# Patient Record
Sex: FEMALE | Race: OTHER | NOT HISPANIC OR LATINO
[De-identification: names, ages, dates, MRNs, and addresses within clinical notes are randomized per-mention and may not be internally consistent; named-entity substitution may affect disease eponyms.]

---

## 2022-08-22 ENCOUNTER — NON-APPOINTMENT (OUTPATIENT)
Age: 27
End: 2022-08-22

## 2022-08-24 ENCOUNTER — APPOINTMENT (OUTPATIENT)
Dept: OBGYN | Facility: CLINIC | Age: 27
End: 2022-08-24

## 2022-09-13 ENCOUNTER — NON-APPOINTMENT (OUTPATIENT)
Age: 27
End: 2022-09-13

## 2022-09-13 ENCOUNTER — LABORATORY RESULT (OUTPATIENT)
Age: 27
End: 2022-09-13

## 2022-09-13 ENCOUNTER — APPOINTMENT (OUTPATIENT)
Dept: OBGYN | Facility: CLINIC | Age: 27
End: 2022-09-13
Payer: SELF-PAY

## 2022-09-13 VITALS
BODY MASS INDEX: 38.09 KG/M2 | WEIGHT: 207 LBS | DIASTOLIC BLOOD PRESSURE: 90 MMHG | SYSTOLIC BLOOD PRESSURE: 130 MMHG | HEIGHT: 62 IN

## 2022-09-13 DIAGNOSIS — N93.9 ABNORMAL UTERINE AND VAGINAL BLEEDING, UNSPECIFIED: ICD-10-CM

## 2022-09-13 DIAGNOSIS — N89.8 OTHER SPECIFIED NONINFLAMMATORY DISORDERS OF VAGINA: ICD-10-CM

## 2022-09-13 DIAGNOSIS — Z00.00 ENCOUNTER FOR GENERAL ADULT MEDICAL EXAMINATION W/OUT ABNORMAL FINDINGS: ICD-10-CM

## 2022-09-13 PROCEDURE — 99204 OFFICE O/P NEW MOD 45 MIN: CPT

## 2022-09-14 ENCOUNTER — NON-APPOINTMENT (OUTPATIENT)
Age: 27
End: 2022-09-14

## 2022-09-14 ENCOUNTER — APPOINTMENT (OUTPATIENT)
Dept: ULTRASOUND IMAGING | Facility: HOSPITAL | Age: 27
End: 2022-09-14

## 2022-09-14 ENCOUNTER — OUTPATIENT (OUTPATIENT)
Dept: OUTPATIENT SERVICES | Facility: HOSPITAL | Age: 27
LOS: 1 days | End: 2022-09-14
Payer: COMMERCIAL

## 2022-09-14 LAB
APPEARANCE: CLEAR
BASOPHILS # BLD AUTO: 0.03 K/UL
BASOPHILS NFR BLD AUTO: 0.5 %
BILIRUBIN URINE: NEGATIVE
BLOOD URINE: ABNORMAL
COLOR: YELLOW
EOSINOPHIL # BLD AUTO: 0.08 K/UL
EOSINOPHIL NFR BLD AUTO: 1.3 %
GLUCOSE QUALITATIVE U: NEGATIVE
HCT VFR BLD CALC: 42.9 %
HGB BLD-MCNC: 13 G/DL
HIV1+2 AB SPEC QL IA.RAPID: NONREACTIVE
IMM GRANULOCYTES NFR BLD AUTO: 0.3 %
KETONES URINE: NEGATIVE
LEUKOCYTE ESTERASE URINE: NEGATIVE
LYMPHOCYTES # BLD AUTO: 2.29 K/UL
LYMPHOCYTES NFR BLD AUTO: 38.6 %
MAN DIFF?: NORMAL
MCHC RBC-ENTMCNC: 30.3 GM/DL
MCHC RBC-ENTMCNC: 30.4 PG
MCV RBC AUTO: 100.5 FL
MONOCYTES # BLD AUTO: 0.56 K/UL
MONOCYTES NFR BLD AUTO: 9.4 %
NEUTROPHILS # BLD AUTO: 2.96 K/UL
NEUTROPHILS NFR BLD AUTO: 49.9 %
NITRITE URINE: NEGATIVE
PH URINE: 6
PLATELET # BLD AUTO: 274 K/UL
PROTEIN URINE: NORMAL
RBC # BLD: 4.27 M/UL
RBC # FLD: 13.5 %
SPECIFIC GRAVITY URINE: 1.03
UROBILINOGEN URINE: NORMAL
WBC # FLD AUTO: 5.94 K/UL

## 2022-09-14 PROCEDURE — 76856 US EXAM PELVIC COMPLETE: CPT

## 2022-09-14 PROCEDURE — 76856 US EXAM PELVIC COMPLETE: CPT | Mod: 26

## 2022-09-14 PROCEDURE — 76830 TRANSVAGINAL US NON-OB: CPT

## 2022-09-14 PROCEDURE — 76830 TRANSVAGINAL US NON-OB: CPT | Mod: 26

## 2022-09-15 ENCOUNTER — TRANSCRIPTION ENCOUNTER (OUTPATIENT)
Age: 27
End: 2022-09-15

## 2022-09-19 DIAGNOSIS — R35.0 FREQUENCY OF MICTURITION: ICD-10-CM

## 2022-09-19 LAB
C TRACH RRNA SPEC QL NAA+PROBE: NOT DETECTED
CANDIDA VAG CYTO: NOT DETECTED
FSH SERPL-MCNC: 3.5 IU/L
G VAGINALIS+PREV SP MTYP VAG QL MICRO: NOT DETECTED
HBV SURFACE AB SER QL: ABNORMAL
HBV SURFACE AG SER QL: NONREACTIVE
HCV AB SER QL: NONREACTIVE
HCV S/CO RATIO: 0.09 S/CO
LH SERPL-ACNC: 4.7 IU/L
N GONORRHOEA RRNA SPEC QL NAA+PROBE: NOT DETECTED
SOURCE AMPLIFICATION: NORMAL
T PALLIDUM AB SER QL IA: NEGATIVE
T VAGINALIS VAG QL WET PREP: NOT DETECTED
TSH SERPL-ACNC: 0.98 UIU/ML

## 2022-09-20 LAB — PROLACTIN SERPL-MCNC: 42.4 NG/ML

## 2022-09-26 ENCOUNTER — TRANSCRIPTION ENCOUNTER (OUTPATIENT)
Age: 27
End: 2022-09-26

## 2022-09-26 LAB — BACTERIA UR CULT: ABNORMAL

## 2022-09-27 ENCOUNTER — NON-APPOINTMENT (OUTPATIENT)
Age: 27
End: 2022-09-27

## 2022-09-29 NOTE — PHYSICAL EXAM
[Appropriately responsive] : appropriately responsive [Alert] : alert [No Acute Distress] : no acute distress [Soft] : soft [Non-tender] : non-tender [Non-distended] : non-distended [Oriented x3] : oriented x3 [Labia Majora] : normal [Labia Minora] : normal [Normal] : normal [Uterine Adnexae] : normal [FreeTextEntry8] : small amount of dark old blood, cervix visualized closed, no polyps seen. BV, GC and candida swabs done. Pap done. Mild CMT. 10 weeks uterus (difficulty to appreciate due to body habitus), no adnexal mass palpated.

## 2022-09-29 NOTE — DISCUSSION/SUMMARY
[FreeTextEntry1] : 25 yo with abnormal uterine bleeding, no pelvic pain and vaginal discharge\par - STI screening done\par - Pap done\par - Referred to pelvic US\par - Prolactin, TSH and LH/FSH drawn\par RTC after completing imaging

## 2022-09-29 NOTE — HISTORY OF PRESENT ILLNESS
[Patient reported PAP Smear was normal] : Patient reported PAP Smear was normal [N] : Patient denies prior pregnancies [Normal Amount/Duration] :  normal amount and duration [Regular Cycle Intervals] : periods have been regular [Menopause Age: ____] : age at menopause was [unfilled] [No] : Patient does not have concerns regarding sex [Previously active] : previously active [PapSmeardate] : 2019 [LMPDate] : 8/22/2022 [MensesFreq] : 28 [MensesLength] : 2-3 [FreeTextEntry1] : 8/22/2022

## 2022-10-13 ENCOUNTER — NON-APPOINTMENT (OUTPATIENT)
Age: 27
End: 2022-10-13

## 2022-10-17 ENCOUNTER — APPOINTMENT (OUTPATIENT)
Dept: MRI IMAGING | Facility: HOSPITAL | Age: 27
End: 2022-10-17

## 2022-11-11 ENCOUNTER — APPOINTMENT (OUTPATIENT)
Dept: ENDOCRINOLOGY | Facility: CLINIC | Age: 27
End: 2022-11-11

## 2022-11-11 ENCOUNTER — NON-APPOINTMENT (OUTPATIENT)
Age: 27
End: 2022-11-11

## 2022-11-11 VITALS
WEIGHT: 207 LBS | DIASTOLIC BLOOD PRESSURE: 98 MMHG | SYSTOLIC BLOOD PRESSURE: 144 MMHG | BODY MASS INDEX: 37.86 KG/M2 | HEART RATE: 79 BPM

## 2022-11-11 DIAGNOSIS — E04.1 NONTOXIC SINGLE THYROID NODULE: ICD-10-CM

## 2022-11-11 DIAGNOSIS — N39.0 URINARY TRACT INFECTION, SITE NOT SPECIFIED: ICD-10-CM

## 2022-11-11 DIAGNOSIS — E22.1 HYPERPROLACTINEMIA: ICD-10-CM

## 2022-11-11 PROCEDURE — 99204 OFFICE O/P NEW MOD 45 MIN: CPT

## 2022-11-11 RX ORDER — SULFAMETHOXAZOLE AND TRIMETHOPRIM 800; 160 MG/1; MG/1
800-160 TABLET ORAL TWICE DAILY
Qty: 6 | Refills: 0 | Status: DISCONTINUED | COMMUNITY
Start: 2022-09-22 | End: 2022-11-11

## 2022-11-11 RX ORDER — NITROFURANTOIN (MONOHYDRATE/MACROCRYSTALS) 25; 75 MG/1; MG/1
100 CAPSULE ORAL
Qty: 10 | Refills: 0 | Status: DISCONTINUED | COMMUNITY
Start: 2022-09-26 | End: 2022-11-11

## 2022-11-11 NOTE — REVIEW OF SYSTEMS
[Dysphagia] : no dysphagia [Neck Pain] : no neck pain [As Noted in HPI] : as noted in HPI [Negative] : Endocrine

## 2022-11-11 NOTE — PHYSICAL EXAM
[Alert] : alert [Healthy Appearance] : healthy appearance [No Acute Distress] : no acute distress [Normal Voice/Communication] : normal voice communication [Normal Hearing] : hearing was normal [No Neck Mass] : no neck mass was observed [No LAD] : no lymphadenopathy [Thyroid Not Enlarged] : the thyroid was not enlarged [No Thyroid Nodules] : no palpable thyroid nodules [No Respiratory Distress] : no respiratory distress [Normal Rate and Effort] : normal respiratory rate and effort [Clear to Auscultation] : lungs were clear to auscultation bilaterally [Normal S1, S2] : normal S1 and S2 [Normal Rate] : heart rate was normal [Regular Rhythm] : with a regular rhythm [Normal Gait] : normal gait [Acanthosis Nigricans] : acanthosis nigricans present [Oriented x3] : oriented to person, place, and time [Normal Affect] : the affect was normal [Normal Insight/Judgement] : insight and judgment were intact [Normal Mood] : the mood was normal

## 2022-11-11 NOTE — HISTORY OF PRESENT ILLNESS
[FreeTextEntry1] : Ms. BUCKLEY is a 26 year female with pmhx of hyperprolactinemia, thyroid nodule who presents for hyperprolactinemia evaluation.\par works as Patient Coordinator at [employer]\par \par Prolactin level found to be elevated in September 2022 in setting of GYN eval for abnormal uterine bleeding.\par Endorses in the past possible elevated LH/FSH, but notes that this could have been abnormal r/t where she was in her menstrual cycle\par Cycle pattern: spotting week befor period, will stop, then have a period (heavy) for 2 days, then stop.  This started last Sept, 9/2021, + stress at this time, which she thought was cause of change in pattern.  Prior to this, menses Q28 days, 3 day in duration, less heavy.\par Not sexually active in a few months, but when active, + bleeding during intercourse\par Stopped nuvaring in 1/2022 as thought was causing abnormal bleeding, no change with d/c\par \par No current desire, but in future\par \par [facility]par Denies galactorrhea\par Denies oligomenorrhea or amenorrhea, cycles as above with abnormal uterine bleeding\par Denies infertility, has not attempted\par Denies decreased libido\par + intermittent headaches, long time pattern of cluster headaches\par Denies tunnel vision\par \par Last menstrual period was 11/8/22 and the patient is not sexually active.\par \par The patient endorses/denies excessive emotional stress. Improved from 9/2021\par \par The patient denies use of the following drugs: antipsychotics, metoclopramide, verapamil, desipramine, clomipramine, codeine, or morphine.\par No SSRI since 9/2021\par Supplements: probiotic, b complex\par There is not a history of chronic renal failure or chest wall injury.\par \par 2. thyroid nodule\par A few years ago, per patient "nothing significant", repeated sonogram 6 months later, last sonogram before pandemic\par 9/2022 TSH

## 2022-11-11 NOTE — ASSESSMENT
[FreeTextEntry1] : 1. Hyperprolactinemia\par 09/13/2022 PRL 42.4 \par 9/20/22 AM, fasting PRL : 37.1\par No current medications\par Causes of minimal elevation of prolactin reviewed today, inclusive of physiological (such as showering the morning of the blood work, sexual activity prior to the blood work, or any other breast stimulation) vs medication related (antidepressants, pain medication) vs hormonal (such as pregnancy, pituitary adenoma or PCOS being most common) vs idiopathic.\par -- labs today\par -- MRI pituitary with/without contrast ordered\par \par 2. thyroid nodule\par history of, last US pre-pandemic following 6 month FU surveillance (stable, at that time, per patient)\par Denies compressive sx\par 9/2022 TSH at goal\par -- repeat thyroid US\par \par 3. frequent UTI, acanthosis nigricans \par -- screen for diabetes\par \par Labs today, I will call/[facility] message with results-- patient prefers [facility]\par Schedule Thyroid US/MRI pituitary at [facility], I will call/[facility] with results\par Follow up, pending labs/imaging studies\par \par Cherelle aHrley MS, FNP-BC, CDCES\par 11/11/2022

## 2022-11-15 ENCOUNTER — TRANSCRIPTION ENCOUNTER (OUTPATIENT)
Age: 27
End: 2022-11-15

## 2022-11-15 LAB
ALBUMIN SERPL ELPH-MCNC: 4.8 G/DL
ALP BLD-CCNC: 83 U/L
ALT SERPL-CCNC: 11 U/L
ANION GAP SERPL CALC-SCNC: 11 MMOL/L
AST SERPL-CCNC: 21 U/L
BILIRUB SERPL-MCNC: 0.4 MG/DL
BUN SERPL-MCNC: 8 MG/DL
CALCIUM SERPL-MCNC: 9.9 MG/DL
CHLORIDE SERPL-SCNC: 103 MMOL/L
CO2 SERPL-SCNC: 25 MMOL/L
CREAT SERPL-MCNC: 0.7 MG/DL
EGFR: 121 ML/MIN/1.73M2
ESTIMATED AVERAGE GLUCOSE: 117 MG/DL
GLUCOSE SERPL-MCNC: 95 MG/DL
HBA1C MFR BLD HPLC: 5.7 %
POTASSIUM SERPL-SCNC: 4.1 MMOL/L
PROLACTIN SERPL-MCNC: 27.4 NG/ML
PROLACTIN SERPL-MCNC: 33.9 NG/ML
PROT SERPL-MCNC: 7.7 G/DL
SODIUM SERPL-SCNC: 139 MMOL/L

## 2022-11-18 LAB
MONOMERIC PROLACTIN (ICMA)*: 5.86 NG/ML
PERCENT MACROPROLACTIN: 79 %
PROLACTIN, SERUM (ICMA)*: 27.9 NG/ML

## 2023-01-03 ENCOUNTER — APPOINTMENT (OUTPATIENT)
Dept: UROGYNECOLOGY | Facility: CLINIC | Age: 28
End: 2023-01-03

## 2023-07-03 LAB — CYTOLOGY CVX/VAG DOC THIN PREP: NORMAL

## 2023-11-14 LAB — PROLACTIN SERPL-MCNC: 37.1 NG/ML

## 2025-08-21 ENCOUNTER — NON-APPOINTMENT (OUTPATIENT)
Age: 30
End: 2025-08-21